# Patient Record
Sex: FEMALE | Race: WHITE | NOT HISPANIC OR LATINO | Employment: OTHER | URBAN - METROPOLITAN AREA
[De-identification: names, ages, dates, MRNs, and addresses within clinical notes are randomized per-mention and may not be internally consistent; named-entity substitution may affect disease eponyms.]

---

## 2021-05-26 ENCOUNTER — RECORDS - HEALTHEAST (OUTPATIENT)
Dept: ADMINISTRATIVE | Facility: CLINIC | Age: 76
End: 2021-05-26

## 2021-06-19 ENCOUNTER — OFFICE VISIT - HEALTHEAST (OUTPATIENT)
Dept: FAMILY MEDICINE | Facility: CLINIC | Age: 76
End: 2021-06-19

## 2021-06-19 DIAGNOSIS — S41.112A LACERATION OF LEFT UPPER EXTREMITY, INITIAL ENCOUNTER: ICD-10-CM

## 2021-06-19 RX ORDER — ROSUVASTATIN CALCIUM 5 MG/1
5 TABLET, COATED ORAL
Status: SHIPPED | COMMUNITY
Start: 2020-04-02

## 2021-06-26 NOTE — PROGRESS NOTES
Assessment & Plan     Laceration of left upper extremity, initial encounter: injury 2d ago, superficial skin flap still attached. This was soaked and dried and bandaged with bacitracin, non-stick adhesive and coban bandage.  No sign of infection at this time we will continue to monitor for this.        20 minutes spent on the date of the encounter doing patient visit and documentation        Return if symptoms worsen or fail to improve.    Luz Yao MD  [facility]   Damaris Mathew is 76 y.o. and presents today for the following health issues : wrist injury on 6/17  HPI   2 days ago, turned around suddenly and hit her left wrist against the corner of a cabinet.  Noticed a little bit of skin had lifted up so she quickly grabbed a nonstick bandage and tape to back down.  Did not have significant bleeding.  Today she tried to take the bandage off noticed that it was completely stuck so she came here.  Minimal pain if bumped, none when not being touched.  No fever or chills.  Generally feels well    Review of Systems  As above      Objective    /74 (Patient Site: Right Arm, Patient Position: Sitting, Cuff Size: Adult Regular)   Pulse 66   Temp 98.4  F (36.9  C)   Resp 18   Wt 148 lb (67.1 kg)   SpO2 98%   There is no height or weight on file to calculate BMI.  Physical Exam  Well appearing, no distress.  There is a 1x1cm triangular skin flap (with a 90 degree angle) and surrounding mild bruising but no erythema/warmth/induration. Minimal bleeding.

## 2021-07-06 VITALS
SYSTOLIC BLOOD PRESSURE: 149 MMHG | DIASTOLIC BLOOD PRESSURE: 74 MMHG | RESPIRATION RATE: 18 BRPM | HEART RATE: 66 BPM | WEIGHT: 148 LBS | TEMPERATURE: 98.4 F | OXYGEN SATURATION: 98 %

## 2024-09-21 ENCOUNTER — OFFICE VISIT (OUTPATIENT)
Dept: FAMILY MEDICINE | Facility: CLINIC | Age: 79
End: 2024-09-21
Payer: COMMERCIAL

## 2024-09-21 VITALS
SYSTOLIC BLOOD PRESSURE: 159 MMHG | RESPIRATION RATE: 16 BRPM | DIASTOLIC BLOOD PRESSURE: 72 MMHG | OXYGEN SATURATION: 100 % | HEART RATE: 71 BPM | TEMPERATURE: 97 F

## 2024-09-21 DIAGNOSIS — T14.8XXA OPEN WOUND: Primary | ICD-10-CM

## 2024-09-21 PROCEDURE — 99203 OFFICE O/P NEW LOW 30 MIN: CPT

## 2024-09-21 RX ORDER — CALCIUM CARBONATE/VITAMIN D3 600 MG-10
TABLET ORAL
COMMUNITY

## 2024-09-21 RX ORDER — AMOXICILLIN 500 MG/1
500 CAPSULE ORAL 3 TIMES DAILY
COMMUNITY
Start: 2024-09-18

## 2024-09-21 RX ORDER — AMPICILLIN TRIHYDRATE 500 MG
1000 CAPSULE ORAL
COMMUNITY

## 2024-09-21 RX ORDER — MUPIROCIN 20 MG/G
OINTMENT TOPICAL 3 TIMES DAILY
Qty: 30 G | Refills: 0 | Status: SHIPPED | OUTPATIENT
Start: 2024-09-21

## 2024-09-21 RX ORDER — DIAZEPAM 2 MG
TABLET ORAL
COMMUNITY
Start: 2024-01-16

## 2024-09-21 RX ORDER — FEXOFENADINE HCL 180 MG/1
180 TABLET ORAL
COMMUNITY

## 2024-09-21 RX ORDER — ANTIOX #8/OM3/DHA/EPA/LUT/ZEAX 250-2.5 MG
CAPSULE ORAL
COMMUNITY

## 2024-09-21 RX ORDER — FLUTICASONE PROPIONATE 50 MCG
2 SPRAY, SUSPENSION (ML) NASAL
COMMUNITY
Start: 2023-01-04

## 2024-09-21 NOTE — PROGRESS NOTES
Assessment & Plan       ICD-10-CM    1. Open wound  T14.8XXA mupirocin (BACTROBAN) 2 % external ointment         Wound on arm healing by secondary intent - granulation tissue from the base. I don't think it would have benefited from stitches as it's really not deep, but it is going to take a little bit to heal. Provided recommendations on dressing the wound and watching for infection. UTD on tetanus.     Follow up with primary care provider with any problems, questions or concerns or if symptoms worsen or fail to improve. Patient agreed to plan and verbalized understanding.     Bon Ocampo is a 79 year old female who presents to clinic today for the following health issues:  Chief Complaint   Patient presents with    Derm Problem     Wound on left arm. Was walking kitchen and scraped against cubbard. Still bleeding some. Takes aspirin 81 once in awhile. Does not have diabetes. Sometimes has problems with wounds not healing. Had tetanus vaccine last in 2020       HPI    Scraped against cupboard a couple days and wife is concerned it looks like it could be infected. No systemic symptoms. UTD on tetanus.     Review of Systems    10 point ROS performed and negative except as noted in HPI.     Problem List:  There are no relevant problems documented for this patient.      No past medical history on file.    Social History     Tobacco Use    Smoking status: Former    Smokeless tobacco: Never   Substance Use Topics    Alcohol use: Not on file           Objective    BP (!) 159/72 (BP Location: Right arm, Patient Position: Sitting, Cuff Size: Adult Regular)   Pulse 71   Temp 97  F (36.1  C) (Tympanic)   Resp 16   SpO2 100%   Physical Exam   Constitutional:       General: Patient is not in acute distress.     Appearance: Normal appearance.   HENT:      Head: Normocephalic and atraumatic.      Right Ear: External ear normal.      Left Ear: External ear normal.      Nose: No congestion, rhinorrhea.       Mouth/Throat:      Mouth: Mucous membranes are moist.      Pharynx: Oropharynx is clear, No exudate.   Eyes:      General: No scleral icterus.     Extraocular Movements: Extraocular movements intact.      Conjunctiva/sclera: Conjunctivae normal.      Pupils: Pupils are equal, round, and reactive to light.   Pulmonary:      Effort: Pulmonary effort is normal.   Cardiovascular:      Regular heart rate  Abdominal:      General: Abdomen is flat.   Musculoskeletal:         General: No swelling or deformity. Normal range of motion.      Cervical back: Normal range of motion and neck supple.   Skin:     General: Skin is warm and dry.      Coloration: Skin is not jaundiced.      Findings: L forearm with 1 cm abrasion with granulation tissue in base. No erythema or pus.   Neurological:      General: No focal deficit present.      Mental Status: Patient is alert. Mental status is at baseline.   Psychiatric:         Mood and Affect: Mood normal.         Behavior: Behavior normal.         Thought Content: Thought content normal.       Dyana Vega MD

## 2025-01-23 ENCOUNTER — APPOINTMENT (OUTPATIENT)
Dept: RADIOLOGY | Facility: HOSPITAL | Age: 80
End: 2025-01-23
Attending: EMERGENCY MEDICINE
Payer: COMMERCIAL

## 2025-01-23 ENCOUNTER — APPOINTMENT (OUTPATIENT)
Dept: ULTRASOUND IMAGING | Facility: HOSPITAL | Age: 80
End: 2025-01-23
Attending: EMERGENCY MEDICINE
Payer: COMMERCIAL

## 2025-01-23 ENCOUNTER — HOSPITAL ENCOUNTER (EMERGENCY)
Facility: HOSPITAL | Age: 80
Discharge: HOME | End: 2025-01-23
Attending: EMERGENCY MEDICINE
Payer: COMMERCIAL

## 2025-01-23 VITALS
WEIGHT: 150 LBS | TEMPERATURE: 98.6 F | OXYGEN SATURATION: 98 % | RESPIRATION RATE: 24 BRPM | SYSTOLIC BLOOD PRESSURE: 150 MMHG | HEART RATE: 80 BPM | DIASTOLIC BLOOD PRESSURE: 70 MMHG

## 2025-01-23 DIAGNOSIS — M79.604 RIGHT LEG PAIN: ICD-10-CM

## 2025-01-23 LAB
ANION GAP SERPL CALCULATED.3IONS-SCNC: 12 MMOL/L (ref 7–15)
ATRIAL RATE - MUSE: 78 BPM
BASOPHILS # BLD AUTO: 0 10E3/UL (ref 0–0.2)
BASOPHILS NFR BLD AUTO: 0 %
BUN SERPL-MCNC: 21.2 MG/DL (ref 8–23)
CALCIUM SERPL-MCNC: 9.3 MG/DL (ref 8.8–10.4)
CHLORIDE SERPL-SCNC: 103 MMOL/L (ref 98–107)
CREAT SERPL-MCNC: 0.91 MG/DL (ref 0.51–0.95)
DIASTOLIC BLOOD PRESSURE - MUSE: 76 MMHG
EGFRCR SERPLBLD CKD-EPI 2021: 63 ML/MIN/1.73M2
EOSINOPHIL # BLD AUTO: 0 10E3/UL (ref 0–0.7)
EOSINOPHIL NFR BLD AUTO: 0 %
ERYTHROCYTE [DISTWIDTH] IN BLOOD BY AUTOMATED COUNT: 13.6 % (ref 10–15)
GLUCOSE SERPL-MCNC: 99 MG/DL (ref 70–99)
HCO3 SERPL-SCNC: 25 MMOL/L (ref 22–29)
HCT VFR BLD AUTO: 39.3 % (ref 35–47)
HGB BLD-MCNC: 12.7 G/DL (ref 11.7–15.7)
IMM GRANULOCYTES # BLD: 0 10E3/UL
IMM GRANULOCYTES NFR BLD: 0 %
INTERPRETATION ECG - MUSE: NORMAL
LYMPHOCYTES # BLD AUTO: 0.9 10E3/UL (ref 0.8–5.3)
LYMPHOCYTES NFR BLD AUTO: 10 %
MCH RBC QN AUTO: 30.2 PG (ref 26.5–33)
MCHC RBC AUTO-ENTMCNC: 32.3 G/DL (ref 31.5–36.5)
MCV RBC AUTO: 94 FL (ref 78–100)
MONOCYTES # BLD AUTO: 1 10E3/UL (ref 0–1.3)
MONOCYTES NFR BLD AUTO: 12 %
NEUTROPHILS # BLD AUTO: 6.9 10E3/UL (ref 1.6–8.3)
NEUTROPHILS NFR BLD AUTO: 78 %
NRBC # BLD AUTO: 0 10E3/UL
NRBC BLD AUTO-RTO: 0 /100
P AXIS - MUSE: 27 DEGREES
PLATELET # BLD AUTO: 247 10E3/UL (ref 150–450)
POTASSIUM SERPL-SCNC: 3.9 MMOL/L (ref 3.4–5.3)
PR INTERVAL - MUSE: 132 MS
QRS DURATION - MUSE: 80 MS
QT - MUSE: 376 MS
QTC - MUSE: 428 MS
R AXIS - MUSE: -29 DEGREES
RBC # BLD AUTO: 4.2 10E6/UL (ref 3.8–5.2)
SODIUM SERPL-SCNC: 140 MMOL/L (ref 135–145)
SYSTOLIC BLOOD PRESSURE - MUSE: 181 MMHG
T AXIS - MUSE: 49 DEGREES
VENTRICULAR RATE- MUSE: 78 BPM
WBC # BLD AUTO: 8.9 10E3/UL (ref 4–11)

## 2025-01-23 PROCEDURE — 85025 COMPLETE CBC W/AUTO DIFF WBC: CPT | Performed by: EMERGENCY MEDICINE

## 2025-01-23 PROCEDURE — 84132 ASSAY OF SERUM POTASSIUM: CPT | Performed by: EMERGENCY MEDICINE

## 2025-01-23 PROCEDURE — 93926 LOWER EXTREMITY STUDY: CPT | Mod: RT

## 2025-01-23 PROCEDURE — 82565 ASSAY OF CREATININE: CPT | Performed by: EMERGENCY MEDICINE

## 2025-01-23 PROCEDURE — 99285 EMERGENCY DEPT VISIT HI MDM: CPT | Mod: 25

## 2025-01-23 PROCEDURE — 73560 X-RAY EXAM OF KNEE 1 OR 2: CPT | Mod: RT

## 2025-01-23 PROCEDURE — 73610 X-RAY EXAM OF ANKLE: CPT | Mod: RT

## 2025-01-23 PROCEDURE — 93971 EXTREMITY STUDY: CPT | Mod: RT

## 2025-01-23 PROCEDURE — 93005 ELECTROCARDIOGRAM TRACING: CPT | Performed by: EMERGENCY MEDICINE

## 2025-01-23 PROCEDURE — 250N000013 HC RX MED GY IP 250 OP 250 PS 637: Performed by: EMERGENCY MEDICINE

## 2025-01-23 PROCEDURE — 80048 BASIC METABOLIC PNL TOTAL CA: CPT | Performed by: EMERGENCY MEDICINE

## 2025-01-23 PROCEDURE — 36415 COLL VENOUS BLD VENIPUNCTURE: CPT | Performed by: EMERGENCY MEDICINE

## 2025-01-23 PROCEDURE — 73590 X-RAY EXAM OF LOWER LEG: CPT | Mod: RT

## 2025-01-23 RX ORDER — ACETAMINOPHEN 325 MG/1
650 TABLET ORAL ONCE
Status: COMPLETED | OUTPATIENT
Start: 2025-01-23 | End: 2025-01-23

## 2025-01-23 RX ADMIN — ACETAMINOPHEN 650 MG: 325 TABLET ORAL at 22:06

## 2025-01-23 ASSESSMENT — ACTIVITIES OF DAILY LIVING (ADL)
ADLS_ACUITY_SCORE: 41

## 2025-01-23 ASSESSMENT — ENCOUNTER SYMPTOMS: JOINT SWELLING: 1

## 2025-01-23 ASSESSMENT — COLUMBIA-SUICIDE SEVERITY RATING SCALE - C-SSRS
2. HAVE YOU ACTUALLY HAD ANY THOUGHTS OF KILLING YOURSELF IN THE PAST MONTH?: NO
6. HAVE YOU EVER DONE ANYTHING, STARTED TO DO ANYTHING, OR PREPARED TO DO ANYTHING TO END YOUR LIFE?: NO
1. IN THE PAST MONTH, HAVE YOU WISHED YOU WERE DEAD OR WISHED YOU COULD GO TO SLEEP AND NOT WAKE UP?: NO

## 2025-01-24 NOTE — ED TRIAGE NOTES
Patient presents to ED with family, pt c/I right foot/ankle pain that started 2 days ago- no traumatic injury.  Also c/o back pain.

## 2025-01-24 NOTE — DISCHARGE INSTRUCTIONS
Recommend Tylenol alternating with ibuprofen.  Follow-up with orthopedics.  Return to the ER for worsening symptoms

## 2025-01-24 NOTE — ED PROVIDER NOTES
EMERGENCY DEPARTMENT ENCOUNTER      NAME: Damaris Mathew  AGE: 79 year old female  YOB: 1945  MRN: [id number]  EVALUATION DATE & TIME: 1/23/2025  7:55 PM    PCP: Patti Vazquez    ED PROVIDER: Dipesh Mckeon MD      Chief Complaint   Patient presents with    Ankle/Foot right    Back Pain         FINAL IMPRESSION:  1. Right leg pain          ED COURSE & MEDICAL DECISION MAKING:    Pertinent Labs & Imaging studies reviewed. (See chart for details)  79 year old female presents to the Emergency Department for evaluation of right leg pain    ED Course as of 01/23/25 2346   Thu Jan 23, 2025   2318 Painful range of motion of knee and ankle doubt septic joint.  Recommend trial of ibuprofen and follow-up orthopedics.  Return the ER for worsening   2344 Patient's pain mostly seems to be with ankle range of motion and knee range of motion.  With his history I doubt lumbar radiculopathy.  Compartments soft.  Patient's pain started in her knee wearing a brace.  With soft compartments doubt compartment   2345 Strong peripheral pulses but right foot felt a little cooler if her arterial ultrasound and venous ultrasound ordered that did not show any evidence of stenosis or DVT   2345 EKG without A-fib.  White blood cell count normal.  Given Tylenol for pain.  White blood cell count normal.  Kidney function normal   2345 X-rays out evidence of fracture   2345 No history of gout   2345 Plan for discharge home with trial of NSAIDs, follow-up orthopedics tomorrow at their walk-in clinic and return to the ER for any worsening symptoms       Medical Decision Making  Obtained supplemental history:Supplemental history obtained?: Documented in chart and Family Member/Significant Other  Reviewed external records: External records reviewed?: Documented in chart  Care impacted by chronic illness:Documented in Chart  Did you consider but not order tests?: Work up considered but not performed and documented in chart, if  applicable  Did you interpret images independently?: Independent interpretation of ECG and images noted in documentation, when applicable.  Consultation discussion with other provider:Did you involve another provider (consultant, , pharmacy, etc.)?: No  Discharge. No recommendations on prescription strength medication(s). N/A.    MIPS: Not Applicable            At the conclusion of the encounter I discussed the results of all of the tests and the disposition. The questions were answered. The patient or family acknowledged understanding and was agreeable with the care plan.     MEDICATIONS GIVEN IN THE EMERGENCY:  Medications   acetaminophen (TYLENOL) tablet 650 mg (650 mg Oral $Given 1/23/25 2159)       NEW PRESCRIPTIONS STARTED AT TODAY'S ER VISIT  New Prescriptions    No medications on file          =================================================================    TRIAGE ASSESSMENT:  Patient presents to ED with family, pt c/I right foot/ankle pain that started 2 days ago- no traumatic injury.  Also c/o back pain.             HPI    Patient information was obtained from: patient and patient's family     Use of : N/A         Damaris Mathew is a 79 year old female with a pertinent history of hypercholesterolemia, CAD, and osteopenia who presents to this ED via walk-in for evaluation of right ankle and knee pain.     Patient reports 2 days of right ankle and knee pain. Says that there is pain when flexing the foot and constant pain to the outer right knee. Says that she did not have any injuries or falls. It is extremely painful to walk on the right foot and when she is not moving. Endorses associated swelling and numbness to the right lower leg. She did wear a brace that she got from her family member for the last 2 days. Denies any toe pain, abdominal pain, chest pain, smoking, history of A-fib, history of blood clots, and history of arthritis.     Per chart review:   1/22/2025: Patient was seen  at pulmonology clinic yesterday. The visit was canceled before she could get her CPAP.     3/18/2024: Patient was seen at [facility] and she has memory loss and a neuropsychological testing was recommended but is not scheduled. Patient has aortic valve disease. Her controlled substance care plan was updated.       REVIEW OF SYSTEMS   Review of Systems   Musculoskeletal:  Positive for joint swelling.        Positive for right foot pain. Positive for right knee pain.         PAST MEDICAL HISTORY:  History reviewed. No pertinent past medical history.    PAST SURGICAL HISTORY:  History reviewed. No pertinent surgical history.        CURRENT MEDICATIONS:    amoxicillin (AMOXIL) 500 MG capsule  aspirin-calcium carbonate 81 mg-300 mg calcium(777 mg) Tab  Cholecalciferol (D 1000) 25 MCG (1000 UT) CAPS  diazepam (VALIUM) 2 MG tablet  fexofenadine (ALLEGRA) 180 MG tablet  fluticasone (FLONASE) 50 MCG/ACT nasal spray  Multiple Vitamins-Minerals (PRESERVISION AREDS 2) CAPS  mupirocin (BACTROBAN) 2 % external ointment  omega 3 1200 MG CAPS  rosuvastatin (CRESTOR) 5 MG tablet        ALLERGIES:  Allergies   Allergen Reactions    Adhesive [Cyanoacrylate] Rash    Other Allergy (See Comments) [External Allergen Needs Reconciliation - See Comment] Rash     Sun and salt water cause papular erythema of palms and feet.       FAMILY HISTORY:  History reviewed. No pertinent family history.    SOCIAL HISTORY:   Social History     Socioeconomic History    Marital status: Single   Tobacco Use    Smoking status: Former    Smokeless tobacco: Never   Vaping Use    Vaping status: Never Used       VITALS:  BP (!) 150/70   Pulse 80   Temp 98.6  F (37  C) (Temporal)   Resp 24   Wt 68 kg (150 lb)   SpO2 98%     PHYSICAL EXAM      Vitals: BP (!) 150/70   Pulse 80   Temp 98.6  F (37  C) (Temporal)   Resp 24   Wt 68 kg (150 lb)   SpO2 98%   General: Appears in no acute distress, awake, alert, interactive.  Eyes:  Conjunctivae non-injected. Sclera anicteric.  HENT: Atraumatic.  Neck: Supple.  Respiratory/Chest: Respiration unlabored.  Abdomen: non distended  Musculoskeletal:  2+ dorsal pedal pulse bilaterally, 2+ posterior tibial pulses bilaterally.  Easily dopplerable right dorsum lateral foot cooler than the left, easily detected doppler pulses on bilateral extremities, symmetric patellar reflexes.  Painful range of motion of right knee.  Painful range of motion of ankle.  No palpable swelling.   Skin: Normal color. No rash or diaphoresis.  Neurologic: Face symmetric, moves all extremities spontaneously. Speech clear.  Psychiatric: Oriented to person, place, and time. Affect appropriate.    LAB:  All pertinent labs reviewed and interpreted.  Results for orders placed or performed during the hospital encounter of 01/23/25   US Lower Extremity Arterial Duplex Right    Impression    IMPRESSION:  1.  No significant arterial stenosis identified.   US Lower Extremity Venous Duplex Right    Impression    IMPRESSION:  1.  No deep venous thrombosis in the right lower extremity.   XR Tibia and Fibula Right 2 Views    Impression    IMPRESSION: Bones are demineralized. Tricompartmental arthritic changes right knee which are advanced in the patellofemoral compartment. There is chondrocalcinosis. There is no evidence of an acute displaced right knee, tibia/fibula or ankle fracture.   Ankle mortise is intact.   Ankle XR, G/E 3 views, right    Impression    IMPRESSION: Bones are demineralized. Tricompartmental arthritic changes right knee which are advanced in the patellofemoral compartment. There is chondrocalcinosis. There is no evidence of an acute displaced right knee, tibia/fibula or ankle fracture.   Ankle mortise is intact.   XR Knee Right 1/2 Views    Impression    IMPRESSION: Bones are demineralized. Tricompartmental arthritic changes right knee which are advanced in the patellofemoral compartment. There is chondrocalcinosis.  There is no evidence of an acute displaced right knee, tibia/fibula or ankle fracture.   Ankle mortise is intact.   Basic metabolic panel   Result Value Ref Range    Sodium 140 135 - 145 mmol/L    Potassium 3.9 3.4 - 5.3 mmol/L    Chloride 103 98 - 107 mmol/L    Carbon Dioxide (CO2) 25 22 - 29 mmol/L    Anion Gap 12 7 - 15 mmol/L    Urea Nitrogen 21.2 8.0 - 23.0 mg/dL    Creatinine 0.91 0.51 - 0.95 mg/dL    GFR Estimate 63 >60 mL/min/1.73m2    Calcium 9.3 8.8 - 10.4 mg/dL    Glucose 99 70 - 99 mg/dL   CBC with platelets and differential   Result Value Ref Range    WBC Count 8.9 4.0 - 11.0 10e3/uL    RBC Count 4.20 3.80 - 5.20 10e6/uL    Hemoglobin 12.7 11.7 - 15.7 g/dL    Hematocrit 39.3 35.0 - 47.0 %    MCV 94 78 - 100 fL    MCH 30.2 26.5 - 33.0 pg    MCHC 32.3 31.5 - 36.5 g/dL    RDW 13.6 10.0 - 15.0 %    Platelet Count 247 150 - 450 10e3/uL    % Neutrophils 78 %    % Lymphocytes 10 %    % Monocytes 12 %    % Eosinophils 0 %    % Basophils 0 %    % Immature Granulocytes 0 %    NRBCs per 100 WBC 0 <1 /100    Absolute Neutrophils 6.9 1.6 - 8.3 10e3/uL    Absolute Lymphocytes 0.9 0.8 - 5.3 10e3/uL    Absolute Monocytes 1.0 0.0 - 1.3 10e3/uL    Absolute Eosinophils 0.0 0.0 - 0.7 10e3/uL    Absolute Basophils 0.0 0.0 - 0.2 10e3/uL    Absolute Immature Granulocytes 0.0 <=0.4 10e3/uL    Absolute NRBCs 0.0 10e3/uL       RADIOLOGY:  Reviewed all pertinent imaging. Please see official radiology report.  US Lower Extremity Arterial Duplex Right   Final Result   IMPRESSION:   1.  No significant arterial stenosis identified.      US Lower Extremity Venous Duplex Right   Final Result   IMPRESSION:   1.  No deep venous thrombosis in the right lower extremity.      Ankle XR, G/E 3 views, right   Final Result   IMPRESSION: Bones are demineralized. Tricompartmental arthritic changes right knee which are advanced in the patellofemoral compartment. There is chondrocalcinosis. There is no evidence of an acute displaced right knee,  tibia/fibula or ankle fracture.    Ankle mortise is intact.      XR Tibia and Fibula Right 2 Views   Final Result   IMPRESSION: Bones are demineralized. Tricompartmental arthritic changes right knee which are advanced in the patellofemoral compartment. There is chondrocalcinosis. There is no evidence of an acute displaced right knee, tibia/fibula or ankle fracture.    Ankle mortise is intact.      XR Knee Right 1/2 Views   Final Result   IMPRESSION: Bones are demineralized. Tricompartmental arthritic changes right knee which are advanced in the patellofemoral compartment. There is chondrocalcinosis. There is no evidence of an acute displaced right knee, tibia/fibula or ankle fracture.    Ankle mortise is intact.          EKG:    Performed at: 20:49:29    Impression: Sinus rhythm, Moderate voltage criteria for LVH-may be normal variant, When compared with ECG of July 10th 2002 05:35 T wave inversion no longer evident in inferior leads    Rate: 78 BPM  Rhythm: sinus  Axis: -29  WA Interval: 132 ms  QRS Interval: 80 ms  QTc Interval: 428 ms  ST Changes: none  Comparison: ECG of July 10th 2002 05:35    I have independently reviewed and interpreted the EKG(s) documented above.      I, Sima Dye, am serving as a scribe to document services personally performed by Dipesh Mckeon MD based on my observation and the provider's statements to me. I, Dipesh Mckeon MD, attest that Sima Dye is acting in a scribe capacity, has observed my performance of the services and has documented them in accordance with my direction.    Dipesh Mckeon MD  [facility] EMERGENCY DEPARTMENT  [address] [place] [id number] [phone]       Dipesh Mckeon MD  01/23/25 0653